# Patient Record
Sex: FEMALE | Race: ASIAN | Employment: UNEMPLOYED | URBAN - METROPOLITAN AREA
[De-identification: names, ages, dates, MRNs, and addresses within clinical notes are randomized per-mention and may not be internally consistent; named-entity substitution may affect disease eponyms.]

---

## 2017-01-20 ENCOUNTER — APPOINTMENT (OUTPATIENT)
Dept: ULTRASOUND IMAGING | Facility: CLINIC | Age: 47
End: 2017-01-20
Attending: EMERGENCY MEDICINE
Payer: MEDICAID

## 2017-01-20 ENCOUNTER — HOSPITAL ENCOUNTER (EMERGENCY)
Facility: CLINIC | Age: 47
Discharge: HOME | End: 2017-01-20
Attending: EMERGENCY MEDICINE | Admitting: EMERGENCY MEDICINE
Payer: MEDICAID

## 2017-01-20 VITALS
RESPIRATION RATE: 18 BRPM | HEART RATE: 78 BPM | WEIGHT: 150 LBS | DIASTOLIC BLOOD PRESSURE: 78 MMHG | SYSTOLIC BLOOD PRESSURE: 125 MMHG | TEMPERATURE: 98.4 F | OXYGEN SATURATION: 99 %

## 2017-01-20 DIAGNOSIS — O20.0 THREATENED MISCARRIAGE: ICD-10-CM

## 2017-01-20 LAB
ABO + RH BLD: NORMAL
B-HCG SERPL-ACNC: 9985 IU/L
BASOPHILS # BLD AUTO: 0 10E9/L (ref 0–0.2)
BASOPHILS NFR BLD AUTO: 0.3 %
BLD GP AB SCN SERPL QL: NORMAL
BLOOD BANK CMNT PATIENT-IMP: NORMAL
BLOOD BANK CMNT PATIENT-IMP: NORMAL
DATE RH IMM GL GVN: NORMAL
DIFFERENTIAL METHOD BLD: ABNORMAL
EOSINOPHIL # BLD AUTO: 0.1 10E9/L (ref 0–0.7)
EOSINOPHIL NFR BLD AUTO: 0.5 %
ERYTHROCYTE [DISTWIDTH] IN BLOOD BY AUTOMATED COUNT: 12.6 % (ref 10–15)
FETAL CELL SCN BLD QL ROSETTE: NORMAL
HCG SERPL QL: POSITIVE
HCT VFR BLD AUTO: 36.9 % (ref 35–47)
HGB BLD-MCNC: 13.1 G/DL (ref 11.7–15.7)
IMM GRANULOCYTES # BLD: 0 10E9/L (ref 0–0.4)
IMM GRANULOCYTES NFR BLD: 0.4 %
LYMPHOCYTES # BLD AUTO: 0.8 10E9/L (ref 0.8–5.3)
LYMPHOCYTES NFR BLD AUTO: 7.3 %
MCH RBC QN AUTO: 31.4 PG (ref 26.5–33)
MCHC RBC AUTO-ENTMCNC: 35.5 G/DL (ref 31.5–36.5)
MCV RBC AUTO: 89 FL (ref 78–100)
MONOCYTES # BLD AUTO: 0.7 10E9/L (ref 0–1.3)
MONOCYTES NFR BLD AUTO: 6.5 %
NEUTROPHILS # BLD AUTO: 8.7 10E9/L (ref 1.6–8.3)
NEUTROPHILS NFR BLD AUTO: 85 %
NRBC # BLD AUTO: 0 10*3/UL
NRBC BLD AUTO-RTO: 0 /100
PLATELET # BLD AUTO: 239 10E9/L (ref 150–450)
RBC # BLD AUTO: 4.17 10E12/L (ref 3.8–5.2)
RH IG VIALS RECOM PATIENT: NORMAL
SPECIMEN EXP DATE BLD: NORMAL
WBC # BLD AUTO: 10.2 10E9/L (ref 4–11)

## 2017-01-20 PROCEDURE — 99285 EMERGENCY DEPT VISIT HI MDM: CPT | Mod: 25

## 2017-01-20 PROCEDURE — 96372 THER/PROPH/DIAG INJ SC/IM: CPT

## 2017-01-20 PROCEDURE — 25000132 ZZH RX MED GY IP 250 OP 250 PS 637: Performed by: EMERGENCY MEDICINE

## 2017-01-20 PROCEDURE — 84702 CHORIONIC GONADOTROPIN TEST: CPT | Performed by: EMERGENCY MEDICINE

## 2017-01-20 PROCEDURE — 96376 TX/PRO/DX INJ SAME DRUG ADON: CPT

## 2017-01-20 PROCEDURE — 96374 THER/PROPH/DIAG INJ IV PUSH: CPT

## 2017-01-20 PROCEDURE — 96375 TX/PRO/DX INJ NEW DRUG ADDON: CPT

## 2017-01-20 PROCEDURE — 76801 OB US < 14 WKS SINGLE FETUS: CPT

## 2017-01-20 PROCEDURE — 86901 BLOOD TYPING SEROLOGIC RH(D): CPT | Performed by: EMERGENCY MEDICINE

## 2017-01-20 PROCEDURE — 86900 BLOOD TYPING SEROLOGIC ABO: CPT | Performed by: EMERGENCY MEDICINE

## 2017-01-20 PROCEDURE — 85461 HEMOGLOBIN FETAL: CPT | Performed by: EMERGENCY MEDICINE

## 2017-01-20 PROCEDURE — 86850 RBC ANTIBODY SCREEN: CPT | Performed by: EMERGENCY MEDICINE

## 2017-01-20 PROCEDURE — 25000125 ZZHC RX 250: Performed by: EMERGENCY MEDICINE

## 2017-01-20 PROCEDURE — 85025 COMPLETE CBC W/AUTO DIFF WBC: CPT | Performed by: EMERGENCY MEDICINE

## 2017-01-20 PROCEDURE — 84703 CHORIONIC GONADOTROPIN ASSAY: CPT | Performed by: EMERGENCY MEDICINE

## 2017-01-20 RX ORDER — ONDANSETRON 2 MG/ML
4 INJECTION INTRAMUSCULAR; INTRAVENOUS ONCE
Status: COMPLETED | OUTPATIENT
Start: 2017-01-20 | End: 2017-01-20

## 2017-01-20 RX ORDER — ONDANSETRON 4 MG/1
4 TABLET, ORALLY DISINTEGRATING ORAL EVERY 4 HOURS PRN
Qty: 10 TABLET | Refills: 0 | Status: SHIPPED | OUTPATIENT
Start: 2017-01-20 | End: 2017-01-23

## 2017-01-20 RX ORDER — MORPHINE SULFATE 4 MG/ML
4 INJECTION, SOLUTION INTRAMUSCULAR; INTRAVENOUS
Status: COMPLETED | OUTPATIENT
Start: 2017-01-20 | End: 2017-01-20

## 2017-01-20 RX ORDER — HYDROCODONE BITARTRATE AND ACETAMINOPHEN 5; 325 MG/1; MG/1
1-2 TABLET ORAL EVERY 4 HOURS PRN
Qty: 20 TABLET | Refills: 0 | Status: SHIPPED | OUTPATIENT
Start: 2017-01-20 | End: 2018-01-17

## 2017-01-20 RX ORDER — HYDROCODONE BITARTRATE AND ACETAMINOPHEN 5; 325 MG/1; MG/1
2 TABLET ORAL ONCE
Status: COMPLETED | OUTPATIENT
Start: 2017-01-20 | End: 2017-01-20

## 2017-01-20 RX ORDER — ONDANSETRON 4 MG/1
4 TABLET, ORALLY DISINTEGRATING ORAL ONCE
Status: DISCONTINUED | OUTPATIENT
Start: 2017-01-20 | End: 2017-01-20

## 2017-01-20 RX ADMIN — ONDANSETRON HYDROCHLORIDE 4 MG: 2 INJECTION, SOLUTION INTRAMUSCULAR; INTRAVENOUS at 21:45

## 2017-01-20 RX ADMIN — HUMAN RHO(D) IMMUNE GLOBULIN 300 MCG: 300 INJECTION, SOLUTION INTRAMUSCULAR at 22:01

## 2017-01-20 RX ADMIN — MORPHINE SULFATE 4 MG: 4 INJECTION, SOLUTION INTRAMUSCULAR; INTRAVENOUS at 18:26

## 2017-01-20 RX ADMIN — MORPHINE SULFATE 4 MG: 4 INJECTION, SOLUTION INTRAMUSCULAR; INTRAVENOUS at 18:50

## 2017-01-20 RX ADMIN — HYDROCODONE BITARTRATE AND ACETAMINOPHEN 2 TABLET: 5; 325 TABLET ORAL at 21:23

## 2017-01-20 RX ADMIN — MORPHINE SULFATE 4 MG: 4 INJECTION, SOLUTION INTRAMUSCULAR; INTRAVENOUS at 19:37

## 2017-01-20 ASSESSMENT — ENCOUNTER SYMPTOMS
ABDOMINAL PAIN: 1
NAUSEA: 1
DIARRHEA: 0
FEVER: 1
CONSTIPATION: 0
CHILLS: 1

## 2017-01-20 NOTE — ED AVS SNAPSHOT
Emergency Department    [address] [facility] [address]    Phone:  [phone]    Fax:  [phone]                                       Almas Hogan   MRN: [id number]    Department:   Emergency Department   Date of Visit:  1/20/2017           After Visit Summary Signature Page     I have received my discharge instructions, and my questions have been answered. I have discussed any challenges I see with this plan with the nurse or doctor.    ..........................................................................................................................................  Patient/Patient Representative Signature      ..........................................................................................................................................  Patient Representative Print Name and Relationship to Patient    ..................................................               ................................................  Date                                            Time    ..........................................................................................................................................  Reviewed by Signature/Title    ...................................................              ..............................................  Date                                                            Time

## 2017-01-20 NOTE — ED AVS SNAPSHOT
Emergency Department    [address] [place] [address]    Phone:  [phone]    Fax:  [phone]                                       Almas Hogan   MRN: [id number]    Department:   Emergency Department   Date of Visit:  2017           Patient Information     Date Of Birth          1970        Your diagnoses for this visit were:     Threatened miscarriage        You were seen by Prosper Matthew MD.      Follow-up Information     Follow up with own OB GYN In 3 days.    Why:  recheck ed if increased pain, fever, weakness, or heavy bleeding        Discharge Instructions         Possible Miscarriage (Threatened )  You may be having a miscarriage.  Common signs of a miscarriage are pain and bleeding. A small amount of bleeding can be normal during the first 3 months of pregnancy. Often the pain and bleeding stop, and you have a normal pregnancy and baby. But heavy bleeding or severe cramping can be an early sign of miscarriage. A  miscarriage  means an unexpected loss of your pregnancy.  At this time, we don t know whether you will have a miscarriage, or if things will clear up and your pregnancy will continue normally. We understand that this is emotionally difficult. There is little we can say to change the way you feel. But understand that miscarriages are common.  About 1 or 2 out of every 10 pregnancies end this way. Some end even before you know you are pregnant. This happens for a number of reasons, and usually we never figure out why. It s important you know that it is not your fault. It didn t happen because you did anything wrong.  Having sex or exercising does not cause a miscarriage. These activities are usually safe unless you have pain or bleeding or your doctor tells you to stop. Even minor falls won t cause a miscarriage. Miscarriages happen because things were not developing as they were supposed to. No medicine can prevent a miscarriage.  Again,  understand that things are uncertain right now. You may still have some bleeding. This may be light spotting or like a period, and you may pass some tissue. You may have some cramping. This is why follow-up care is important.  Home care  To improve the chance of keeping your pregnancy, you should take these steps:    Rest in bed until the pain and bleeding stop.    Don t have sex until your health care provider says it s OK.    Use sanitary napkins instead of tampons.    Don t douche.    Don t take aspirin, ibuprofen, or naproxen.    Don t have alcoholic or caffeinated beverage or smoke.  Follow-up care  Make an appointment with your doctor within the next week, or as directed by our staff.  Note: If you had an ultrasound, a radiologist will review it. You will be told of any new findings that may affect your care.  Call 911  Call 911 if you have:    Severe pain and very heavy bleeding    Severe lightheadedness, passing out, or fainting    Rapid heart rate    Difficulty breathing    Confusion or difficulty waking up  When to seek medical advice  Call your health care provider right away if any of these occur:    Vaginal bleeding or pain that lasts for more than 3 days    Heavy bleeding. This means soaking 1 new pad an hour over 3 hours.    Fever of 100.4 F (38 C) or higher, or as directed by your health care provider    Pain in your lower belly (abdomen) that gets worse    Weakness or dizziness    Passage of anything that resembles tissue. This would be pink or grayish membrane or solid material. Save the tissue in a clean container and bring it to your provider.       7691-8127 [employer]. [address] [place] [address], [place] [address]. All rights reserved. This information is not intended as a substitute for professional medical care. Always follow your healthcare professional's instructions.          24 Hour Appointment Hotline       To make an appointment at any [facility], call [phone]  ([phone]). If you don't have a family doctor or clinic, we will help you find one. Many clinics are conveniently located to serve the needs of you and your family.             Review of your medicines      START taking        Dose / Directions Last dose taken    HYDROcodone-acetaminophen 5-325 MG per tablet   Commonly known as:  NORCO   Dose:  1-2 tablet   Quantity:  20 tablet        Take 1-2 tablets by mouth every 4 hours as needed   Refills:  0        ondansetron 4 MG ODT tab   Commonly known as:  ZOFRAN ODT   Dose:  4 mg   Quantity:  10 tablet        Take 1 tablet (4 mg) by mouth every 4 hours as needed   Refills:  0                Prescriptions were sent or printed at these locations (2 Prescriptions)                   Other Prescriptions                Printed at Department/Unit printer (2 of 2)         HYDROcodone-acetaminophen (NORCO) 5-325 MG per tablet               ondansetron (ZOFRAN ODT) 4 MG ODT tab                Procedures and tests performed during your visit     CBC with platelets differential    HCG QUALitative    HCG quantitative pregnancy    Rh Immune Globulin Study    Rh type    Rhogam Order    US OB 1St Trimester Multiple w Transvag      Orders Needing Specimen Collection     Ordered          01/20/17 1818  UA with Microscopic - STAT, Prio: STAT, Needs to be Collected     Scheduled Task Status   01/20/17 1819 Collect UA with Microscopic Open   Order Class:  PCU Collect                  Pending Results     Date and Time Order Name Status Description    1/20/2017 2038 Rh Immune Globulin Study In process     1/20/2017 1846 US OB 1St Trimester Multiple w Transvag Preliminary             Pending Culture Results     No orders found from 1/19/2017 to 1/21/2017.       Test Results from your hospital stay           1/20/2017  6:54 PM - Interface, Flexilab Results      Component Results     Component Value Ref Range & Units Status    WBC 10.2 4.0 - 11.0 10e9/L Final    RBC Count 4.17 3.8 - 5.2  10e12/L Final    Hemoglobin 13.1 11.7 - 15.7 g/dL Final    Hematocrit 36.9 35.0 - 47.0 % Final    MCV 89 78 - 100 fl Final    MCH 31.4 26.5 - 33.0 pg Final    MCHC 35.5 31.5 - 36.5 g/dL Final    RDW 12.6 10.0 - 15.0 % Final    Platelet Count 239 150 - 450 10e9/L Final    Diff Method Automated Method  Final    % Neutrophils 85.0 % Final    % Lymphocytes 7.3 % Final    % Monocytes 6.5 % Final    % Eosinophils 0.5 % Final    % Basophils 0.3 % Final    % Immature Granulocytes 0.4 % Final    Nucleated RBCs 0 0 /100 Final    Absolute Neutrophil 8.7 (H) 1.6 - 8.3 10e9/L Final    Absolute Lymphocytes 0.8 0.8 - 5.3 10e9/L Final    Absolute Monocytes 0.7 0.0 - 1.3 10e9/L Final    Absolute Eosinophils 0.1 0.0 - 0.7 10e9/L Final    Absolute Basophils 0.0 0.0 - 0.2 10e9/L Final    Abs Immature Granulocytes 0.0 0 - 0.4 10e9/L Final    Absolute Nucleated RBC 0.0  Final         1/20/2017  6:47 PM - Interface, Flexilab Results      Component Results     Component Value Ref Range & Units Status    HCG Qualitative Serum Positive (A) NEG Final         1/20/2017  7:30 PM - Interface, Flexilab Results      Component Results     Component    ABO    O    RH(D)     Neg    Specimen Expires    01/23/2017 1/20/2017  7:48 PM - Interface, Flexilab Results      Component Results     Component Value Ref Range & Units Status    HCG Quantitative Serum 9985 IU/L Final         1/20/2017  8:29 PM - Interface, Radiant Ib      Narrative     PELVIC ULTRASOUND WITH ENDOVAGINAL TRANSDUCER    1/20/2017 8:21 PM     HISTORY: Active bleeding and pain. Question pregnant.    TECHNIQUE: Endovaginal sonography was added to the transabdominal exam  to better evaluate the uterus and ovaries.    COMPARISON: None.    FINDINGS: There is a gestational sac localizing to the lower uterine  segment. A fetal pole is identified with a crown-rump length of 1.3  cm, and estimated gestational age of 7 weeks 4 days. No detected fetal  cardiac activity. No free fluid  in the pelvis noted.        Impression     IMPRESSION: No detected fetal cardiac activity. Gestational sac in an  abnormally low position at the lower uterine segment. These findings  are consistent with nonviable pregnancy. Recommend confirmation with  serial beta hCG assessment and imaging as necessary.         1/20/2017  8:41 PM - Interface, Flexilab Results      Component Results     Component    Rhogam Order    Order received   See Rhogam Study/Suitability           1/20/2017  9:23 PM - Interface, Flexilab Results      Component Results     Component    ABO    O    RH(D)     Neg    Fetal Blood Screen    Canceled, Test credited    Blood Bank Comment    Suitable for Rh Immunoglobulin    RhIg Administered    Pending    Amount of RHIG Required    300 micrograms Rh Immune Globulin required    Antibody Screen    Neg                Clinical Quality Measure: Blood Pressure Screening     Your blood pressure was checked while you were in the emergency department today. The last reading we obtained was  BP: 125/78 mmHg . Please read the guidelines below about what these numbers mean and what you should do about them.  If your systolic blood pressure (the top number) is less than 120 and your diastolic blood pressure (the bottom number) is less than 80, then your blood pressure is normal. There is nothing more that you need to do about it.  If your systolic blood pressure (the top number) is 120-139 or your diastolic blood pressure (the bottom number) is 80-89, your blood pressure may be higher than it should be. You should have your blood pressure rechecked within a year by a primary care provider.  If your systolic blood pressure (the top number) is 140 or greater or your diastolic blood pressure (the bottom number) is 90 or greater, you may have high blood pressure. High blood pressure is treatable, but if left untreated over time it can put you at risk for heart attack, stroke, or kidney failure. You should have your  "blood pressure rechecked by a primary care provider within the next 4 weeks.  If your provider in the emergency department today gave you specific instructions to follow-up with your doctor or provider even sooner than that, you should follow that instruction and not wait for up to 4 weeks for your follow-up visit.        Thank you for choosing [facility]       Thank you for choosing [facility] for your care. Our goal is always to provide you with excellent care. Hearing back from our patients is one way we can continue to improve our services. Please take a few minutes to complete the written survey that you may receive in the mail after you visit with us. Thank you!        [employer] Information     [employer] lets you send messages to your doctor, view your test results, renew your prescriptions, schedule appointments and more. To sign up, go to [email or web address] . Click on \"Log in\" on the left side of the screen, which will take you to the Welcome page. Then click on \"Sign up Now\" on the right side of the page.     You will be asked to enter the access code listed below, as well as some personal information. Please follow the directions to create your username and password.     Your access code is: [email or web address]  Expires: 2017  9:58 PM     Your access code will  in 90 days. If you need help or a new code, please call your [facility] clinic or [phone].        Care EveryWhere ID     This is your Care EveryWhere ID. This could be used by other organizations to access your [facility] medical records  [id number]        After Visit Summary       This is your record. Keep this with you and show to your community pharmacist(s) and doctor(s) at your next visit.                  "

## 2017-01-21 NOTE — DISCHARGE INSTRUCTIONS
Possible Miscarriage (Threatened )  You may be having a miscarriage.  Common signs of a miscarriage are pain and bleeding. A small amount of bleeding can be normal during the first 3 months of pregnancy. Often the pain and bleeding stop, and you have a normal pregnancy and baby. But heavy bleeding or severe cramping can be an early sign of miscarriage. A  miscarriage  means an unexpected loss of your pregnancy.  At this time, we don t know whether you will have a miscarriage, or if things will clear up and your pregnancy will continue normally. We understand that this is emotionally difficult. There is little we can say to change the way you feel. But understand that miscarriages are common.  About 1 or 2 out of every 10 pregnancies end this way. Some end even before you know you are pregnant. This happens for a number of reasons, and usually we never figure out why. It s important you know that it is not your fault. It didn t happen because you did anything wrong.  Having sex or exercising does not cause a miscarriage. These activities are usually safe unless you have pain or bleeding or your doctor tells you to stop. Even minor falls won t cause a miscarriage. Miscarriages happen because things were not developing as they were supposed to. No medicine can prevent a miscarriage.  Again, understand that things are uncertain right now. You may still have some bleeding. This may be light spotting or like a period, and you may pass some tissue. You may have some cramping. This is why follow-up care is important.  Home care  To improve the chance of keeping your pregnancy, you should take these steps:    Rest in bed until the pain and bleeding stop.    Don t have sex until your health care provider says it s OK.    Use sanitary napkins instead of tampons.    Don t douche.    Don t take aspirin, ibuprofen, or naproxen.    Don t have alcoholic or caffeinated beverage or smoke.  Follow-up care  Make an  appointment with your doctor within the next week, or as directed by our staff.  Note: If you had an ultrasound, a radiologist will review it. You will be told of any new findings that may affect your care.  Call 911  Call 911 if you have:    Severe pain and very heavy bleeding    Severe lightheadedness, passing out, or fainting    Rapid heart rate    Difficulty breathing    Confusion or difficulty waking up  When to seek medical advice  Call your health care provider right away if any of these occur:    Vaginal bleeding or pain that lasts for more than 3 days    Heavy bleeding. This means soaking 1 new pad an hour over 3 hours.    Fever of 100.4 F (38 C) or higher, or as directed by your health care provider    Pain in your lower belly (abdomen) that gets worse    Weakness or dizziness    Passage of anything that resembles tissue. This would be pink or grayish membrane or solid material. Save the tissue in a clean container and bring it to your provider.       4739-2062 [employer]. [address] [place] [address], [place] [address]. All rights reserved. This information is not intended as a substitute for professional medical care. Always follow your healthcare professional's instructions.

## 2017-01-21 NOTE — ED NOTES
Pt continues to cramp, Vicodin 2 tabs given with crackers.    Pt then c/o nausea.  Pt given Zofran as ordered.

## 2017-01-21 NOTE — ED NOTES
Pt given Rhogam as ordered.    Pt discharged home in stable condition, some cramping and bleeding noted at time of discharge.  Discharge instructions given and understood.  Prescriptions given to pt at time of discharge.

## 2017-01-21 NOTE — ED PROVIDER NOTES
History     Chief Complaint:  Abdominal Cramping and Vaginal Bleeding      HPI   Almas Hogan is a 46 year old  female who presents with concerns for abdominal pain and vaginal bleeding. The patient had onset of vaginal bleeding and suprapubic abdominal pain this morning which was initially intermittent but is now constant. She has used 7 pads today. The patient has had associated nausea, chills and subjective fever. The patient's last period was 2 months ago and they are irregular at baseline. She does not use birth control or hormone therapy. The patient took ibuprofen at 1400 and has also taken a muscle relaxer today. The patient has had not bowel problems.     Allergies:  No known drug allergies.      Medications:    The patient is currently on no regular medications.       Past Medical History:    History reviewed.  No significant past medical history.       Past Surgical History:         Family History:    History reviewed. No pertinent family history.      Social History:  Marital Status:  Single   Presents to the ED with her boyfriend     Review of Systems   Constitutional: Positive for fever and chills.   Gastrointestinal: Positive for nausea and abdominal pain. Negative for diarrhea and constipation.   Genitourinary: Positive for vaginal bleeding.   All other systems reviewed and are negative.      Physical Exam     Patient Vitals for the past 24 hrs:   BP Temp Temp src Heart Rate Resp SpO2 Weight   17 2030 120/73 mmHg - - - - 96 % -   17 1930 101/70 mmHg - - - - 98 % -   17 1900 111/70 mmHg - - - - 97 % -   17 1806 117/89 mmHg 98.4  F (36.9  C) Oral 74 18 99 % 68.04 kg (150 lb)           Physical Exam  Constitutional:  female supine  HENT: No signs of trauma.   Eyes: EOM are normal. Pupils are equal, round, and reactive to light.   Neck: Normal range of motion. No JVD present. No cervical adenopathy.  Cardiovascular: Regular rhythm.  Exam reveals  no gallop and no friction rub.    No murmur heard.  Pulmonary/Chest: Bilateral breath sounds normal. No wheezes, rhonchi or rales.  Abdominal: Soft. Suprapubic tenderness. No rebound or guarding. 2+ femoral pulses.  : Vulva negative. Vagina with large amount of blood with some clots. Cervix is large with clot within os. Cervix is closed. Lower uterine segment full and firm. No adnexal masses appreciated.   Musculoskeletal: No edema. No tenderness.   Lymphadenopathy: No lymphadenopathy.   Neurological: Alert and oriented to person, place, and time. Normal strength. Coordination normal.   Skin: Skin is warm and dry. No rash noted. No erythema.      Emergency Department Course     Imaging:  Radiographic findings were communicated with the patient who voiced understanding of the findings.    US Pelvis Complete with transvaginal  IMPRESSION: No detected fetal cardiac activity. Gestational sac in an  abnormally low position at the lower uterine segment. These findings  are consistent with nonviable pregnancy. Recommend confirmation with  serial beta hCG assessment and imaging as necessary.  Preliminary radiology read.        Laboratory:  CBC:  WBC 10.2, HGB 13.1,   HCG qualitative: Positive  HCG quantitative: 9985   Rh type: O negative      Interventions:  (1850) Morphine 4 mg IV  (1937) Morphine 4 mg IV  (2123) Norco 5-325 mg PO x 2  (2145) Zofran 4 mg IV  (2201) Rhogam 300 mcg IM    Emergency Department Course:  Nursing notes and vitals reviewed.  (1812) I performed an exam of the patient as documented above.    Blood was drawn from the patient. This was sent for laboratory testing, findings above.      (1835) Patient update.     The patient was sent for a pelvic ultrasound while in the emergency department, findings above.      (2035) Findings and plan explained to the patient. Patient discharged home with instructions regarding supportive care, medications, and reasons to return. The importance of close  follow-up was reviewed. The patient was prescribed Norco.      Impression & Plan      Medical Decision Making:  Almas Hogan is a 46 year old female who presents with lower abdominal pain and vaginal bleeding that started today. She has no nausea or vomiting, urinary or bowel problems. She is sexually active and is not on birth control. She is  to her knowledge. On exam she has suprapubic discomfort. On pelvic exam there was a large amount of blood within the vagina and a small clot in the cervical os which is closed. There is a bluish tint to the cervix. Pregnancy test did come back positive and ultrasound reveals a 7 week 4 day intrauterine pregnancy without fetal heart beat and it is in an abnormal position in the lower uterine segment. Patient is O negative. She is receiving a Rhogam injection and she has been given pain medication. She is feeling more comfortable now. We have talked about the non-viability of the fetus without heart beat at 7.5 weeks and also that most likely she will miscarry. She feels comfortable going home with some pain medication. I have prescribed Vicodin. We have talked about returning to the ED if she should have increasing pain, weakness, fevers, or vaginal bleeding greater than a pad every 30 minutes. She should not have intercourse or use tampons in the meantime. She has her own OB/GYN and she should call Monday morning for reexamination.       Diagnosis:    ICD-10-CM   1. Threatened miscarriage O20.0       Disposition:  Patient is discharged to home.     Discharge Medications:  New Prescriptions    HYDROCODONE-ACETAMINOPHEN (NORCO) 5-325 MG PER TABLET    Take 1-2 tablets by mouth every 4 hours as needed         Jesus Cornejo  2017    EMERGENCY DEPARTMENT    I,Jesus Cornejo, am serving as a scribe on 2017 at 6:12 PM to personally document services performed by Dr. Matthew based on my observations and the provider's statements to me.      Prosper Matthew,  MD  01/20/17 8945

## 2018-01-17 LAB
DEPRECATED S PYO AG THROAT QL EIA: NORMAL
FLUAV+FLUBV AG SPEC QL: NEGATIVE
FLUAV+FLUBV AG SPEC QL: NEGATIVE
SPECIMEN SOURCE: NORMAL
SPECIMEN SOURCE: NORMAL

## 2018-01-17 PROCEDURE — 87081 CULTURE SCREEN ONLY: CPT | Performed by: EMERGENCY MEDICINE

## 2018-01-17 PROCEDURE — 87804 INFLUENZA ASSAY W/OPTIC: CPT | Performed by: EMERGENCY MEDICINE

## 2018-01-17 PROCEDURE — 87880 STREP A ASSAY W/OPTIC: CPT | Performed by: EMERGENCY MEDICINE

## 2018-01-17 PROCEDURE — 99283 EMERGENCY DEPT VISIT LOW MDM: CPT

## 2018-01-18 ENCOUNTER — HOSPITAL ENCOUNTER (EMERGENCY)
Facility: CLINIC | Age: 48
Discharge: HOME | End: 2018-01-18
Attending: EMERGENCY MEDICINE | Admitting: EMERGENCY MEDICINE
Payer: COMMERCIAL

## 2018-01-18 VITALS
HEIGHT: 65 IN | WEIGHT: 148.4 LBS | OXYGEN SATURATION: 97 % | SYSTOLIC BLOOD PRESSURE: 117 MMHG | DIASTOLIC BLOOD PRESSURE: 77 MMHG | BODY MASS INDEX: 24.72 KG/M2 | TEMPERATURE: 100.6 F

## 2018-01-18 DIAGNOSIS — B34.9 VIRAL SYNDROME: ICD-10-CM

## 2018-01-18 PROCEDURE — 25000132 ZZH RX MED GY IP 250 OP 250 PS 637: Performed by: EMERGENCY MEDICINE

## 2018-01-18 RX ORDER — ACETAMINOPHEN 500 MG
1000 TABLET ORAL ONCE
Status: COMPLETED | OUTPATIENT
Start: 2018-01-18 | End: 2018-01-18

## 2018-01-18 RX ORDER — NAPROXEN 500 MG/1
500 TABLET ORAL
Qty: 24 TABLET | Refills: 0 | Status: SHIPPED | OUTPATIENT
Start: 2018-01-18 | End: 2018-01-26

## 2018-01-18 RX ORDER — IBUPROFEN 600 MG/1
600 TABLET, FILM COATED ORAL ONCE
Status: COMPLETED | OUTPATIENT
Start: 2018-01-18 | End: 2018-01-18

## 2018-01-18 RX ADMIN — IBUPROFEN 600 MG: 600 TABLET ORAL at 00:45

## 2018-01-18 RX ADMIN — ACETAMINOPHEN 1000 MG: 500 TABLET, FILM COATED ORAL at 00:44

## 2018-01-18 ASSESSMENT — ENCOUNTER SYMPTOMS
SORE THROAT: 1
DIARRHEA: 0
NAUSEA: 1
FEVER: 1
ABDOMINAL PAIN: 0
HEADACHES: 1
VOMITING: 0
CHILLS: 1

## 2018-01-18 NOTE — DISCHARGE INSTRUCTIONS

## 2018-01-18 NOTE — ED PROVIDER NOTES
"  History     Chief Complaint:  Flu Symptoms    HPI   Almas Hogan is a 47 year old female who presents to the emergency department for evaluation of flu symptoms. The patient states this afternoon she had an intense onset of cold chills, sore throat, headache, a minor fever, and body aches. She also complains of nausea but denies vomiting, diarrhea, abdominal pain, chest pain. She has not taken any meds for this.     Allergies:  No known Drug Allergies      Medications:    Medications reviewed. No current medications.     Past Medical History:    History reviewed. No pertinent past medical history.    Past Surgical History:    Gateway teeth  C section    Family History:    History reviewed. No pertinent family history.     Social History:  Marital Status:  Single [1]  Social History   Substance Use Topics     Smoking status: Never Smoker     Smokeless tobacco: Never Used     Alcohol use Yes      Comment: occasional        Review of Systems   Constitutional: Positive for chills and fever.   HENT: Positive for sore throat.    Cardiovascular: Negative for chest pain.   Gastrointestinal: Positive for nausea. Negative for abdominal pain, diarrhea and vomiting.   Neurological: Positive for headaches.   All other systems reviewed and are negative.        Physical Exam   First Vitals:  BP: 118/73  Heart Rate: 99  Temp: 100.6  F (38.1  C)  Height: 165.1 cm (5' 5\")  Weight: 67.3 kg (148 lb 6.4 oz)  SpO2: 98 %      Physical Exam  Vitals: reviewed by me  General: Pt seen on [facility], pleasant, cooperative, and alert to conversation, has mask on and many blankets, appears cold, but no diaphoresis, nontoxic appearing.    Eyes: Tracking well, clear conjunctiva BL  ENT: MMM, midline trachea. Oropharynx unremarkable, appears well-hydrated.  Lymph: No lymphadenopathy or masses noted  Lungs:   No tachypnea, no accessory muscle use. No respiratory distress.  Lungs are clear to auscultation bilaterally, patient's coughing " frequently.  CV: Rate as above, regular rhythm.    Abd: Soft, non tender, no guarding, no rebound. Non distended  MSK: no peripheral edema or joint effusion.  No evidence of trauma  Skin: No rash, normal turgor and temperature  Neuro: Clear speech and no facial droop.  Moving all extremities spontaneously  Psych: Not RIS, no e/o AH/VH        Emergency Department Course     Laboratory:  Laboratory findings were communicated with the patient who voiced understanding of the findings.    Rapid strep: negative  Beta strep: Pending  Influenza: negative    Interventions:  0044 tylenol 1000 mg PO  0045 ibuprofen 600 mg PO    Emergency Department Course:  Nursing notes and vitals reviewed.  I performed an exam of the patient as documented above.   I discussed the treatment plan with the patient. They expressed understanding of this plan and consented to discharge. They will be discharged home with instructions for care and follow up. In addition, the patient will return to the emergency department if their symptoms persist, worsen, if new symptoms arise or if there is any concern.  All questions were answered.     I personally reviewed the lab results with the patient and answered all related questions prior to discharge.  Impression & Plan      Medical Decision Making:  This patient is a 47 female who presents with what appears to be a viral syndrome. Influenza negative, and has a normal cardiopulmonary exam at bedside but does have subjective fevers chills and low grade fever here which has ameliorated here with symptomatic treatment only. Patient has no significant comorbidities, and while she still may have the flu I have elected to treat her supportively with rest, fluids and naproxen and primary care provider follow up win the coming week. Specifically will note that I see no evidence of pneumonia, abdominal malady, no evidence of bacterial sepsis, or oropharyngeal abnormality. Patient is acting appropriately with  family at bedside and with cough, cold, runny nose, and muscle aches this does appear to be a flu like syndrome. Will discharged as above with primary care provider follow up.     Diagnosis:    ICD-10-CM    1. Viral syndrome B34.9      Disposition:   Discharged    Discharge Medications:  New Prescriptions    NAPROXEN (NAPROSYN) 500 MG TABLET    Take 1 tablet (500 mg) by mouth 3 times daily (with meals) for 8 days       Scribe Disclosure:  I, Shalom Lucas, am serving as a scribe at 2:05 AM on 1/18/2018 to document services personally performed by Garrett Gutierrez MD, based on my observations and the provider's statements to me.       EMERGENCY DEPARTMENT       Garrett Gutierrez MD  01/18/18 2119

## 2018-01-18 NOTE — ED AVS SNAPSHOT
"  Emergency Department    [address] [facility] [address]    Phone:  [phone]    Fax:  [phone]                                       Almas Hogan   MRN: [id number]    Department:   Emergency Department   Date of Visit:  1/17/2018           Patient Information     Date Of Birth          1970        Your diagnoses for this visit were:     Viral syndrome        You were seen by Garrett Gutierrez MD.      Follow-up Information     Follow up with No Ref-Primary, Physician. Schedule an appointment as soon as possible for a visit in 2 days.    Why:  As needed, For repeat evaluation and symptom check        Follow up with  Emergency Department.    Specialty:  EMERGENCY MEDICINE    Why:  If symptoms worsen    Contact information:    [address] [facility] [address] [phone]        Discharge Instructions         Viral Syndrome (Adult)  A viral illness may cause a number of symptoms. The symptoms depend on the part of the body that the virus affects. If it settles in your nose, throat, and lungs, it may cause cough, sore throat, congestion, and sometimes headache. If it settles in your stomach and intestinal tract, it may cause vomiting and diarrhea. Sometimes it causes vague symptoms like \"aching all over,\" feeling tired, loss of appetite, or fever.  A viral illness usually lasts 1 to 2 weeks, but sometimes it lasts longer. In some cases, a more serious infection can look like a viral syndrome in the first few days of the illness. You may need another exam and additional tests to know the difference. Watch for the warning signs listed below.  Home care  Follow these guidelines for taking care of yourself at home:    If symptoms are severe, rest at home for the first 2 to 3 days.    Stay away from cigarette smoke - both your smoke and the smoke from others.    You may use over-the-counter acetaminophen or ibuprofen for fever, muscle aching, " and headache, unless another medicine was prescribed for this. If you have chronic liver or kidney disease or ever had a stomach ulcer or GI bleeding, talk with your doctor before using these medicines. No one who is younger than 18 and ill with a fever should take aspirin. It may cause severe disease or death.    Your appetite may be poor, so a light diet is fine. Avoid dehydration by drinking 8 to 12 8-ounce glasses of fluids each day. This may include water; orange juice; lemonade; apple, grape, and cranberry juice; clear fruit drinks; electrolyte replacement and sports drinks; and decaffeinated teas and coffee. If you have been diagnosed with a kidney disease, ask your doctor how much and what types of fluids you should drink to prevent dehydration. If you have kidney disease, drinking too much fluid can cause it build up in the your body and be dangerous to your health.    Over-the-counter remedies won't shorten the length of the illness but may be helpful for cough, sore throat; and nasal and sinus congestion. Don't use decongestants if you have high blood pressure.  Follow-up care  Follow up with your healthcare provider if you do not improve over the next week.  Call 911  Get emergency medical care if any of the following occur:    Convulsion    Feeling weak, dizzy, or like you are going to faint    Chest pain, shortness of breath, wheezing, or difficulty breathing  When to seek medical advice  Call your healthcare provider right away if any of these occur:    Cough with lots of colored sputum (mucus) or blood in your sputum    Chest pain, shortness of breath, wheezing, or difficulty breathing    Severe headache; face, neck, or ear pain    Severe, constant pain in the lower right side of your belly (abdominal)    Continued vomiting (can t keep liquids down)    Frequent diarrhea (more than 5 times a day); blood (red or black color) or mucus in diarrhea    Feeling weak, dizzy, or like you are going to  faint    Extreme thirst    Fever of 100.4 F (38 C) or higher, or as directed by your healthcare provider  Date Last Reviewed: 9/25/2015 2000-2017 [employer]. [address] [place] [address], [place] [address]. All rights reserved. This information is not intended as a substitute for professional medical care. Always follow your healthcare professional's instructions.          24 Hour Appointment Hotline       To make an appointment at any [facility], call [phone] ([phone]). If you don't have a family doctor or clinic, we will help you find one. [facility] clinics are conveniently located to serve the needs of you and your family.             Review of your medicines      START taking        Dose / Directions Last dose taken    naproxen 500 MG tablet   Commonly known as:  NAPROSYN   Dose:  500 mg   Quantity:  24 tablet        Take 1 tablet (500 mg) by mouth 3 times daily (with meals) for 8 days   Refills:  0                Prescriptions were sent or printed at these locations (1 Prescription)                   Other Prescriptions                Printed at Department/Unit printer (1 of 1)         naproxen (NAPROSYN) 500 MG tablet                Procedures and tests performed during your visit     Beta strep group A culture    Influenza A/B antigen    Rapid strep screen      Orders Needing Specimen Collection     None      Pending Results     Date and Time Order Name Status Description    1/17/2018 2315 Beta strep group A culture Preliminary             Pending Culture Results     Date and Time Order Name Status Description    1/17/2018 2315 Beta strep group A culture Preliminary             Pending Results Instructions     If you had any lab results that were not finalized at the time of your Discharge, you can call the ED Lab Result RN at [phone]. You will be contacted by this team for any positive Lab results or changes in treatment. The nurses are available 7 days a week from 10A to  6:30P.  You can leave a message 24 hours per day and they will return your call.        Test Results From Your Hospital Stay        1/17/2018 11:51 PM      Component Results     Component Value Ref Range & Units Status    Influenza A/B Agn Specimen Nasopharyngeal  Final    Influenza A Negative NEG^Negative Final    Influenza B Negative NEG^Negative Final    Test results must be correlated with clinical data. If necessary, results   should be confirmed by a molecular assay or viral culture.           1/17/2018 11:37 PM      Component Results     Component    Specimen Description    Throat    Rapid Strep A Screen    NEGATIVE: No Group A streptococcal antigen detected by immunoassay, await culture report.         1/18/2018  1:24 AM      Component Results     Component    Specimen Description    Throat    Special Requests    Specimen collected in eSwab transport (white cap)    Culture Micro    PENDING                Clinical Quality Measure: Blood Pressure Screening     Your blood pressure was checked while you were in the emergency department today. The last reading we obtained was  BP: 117/77 . Please read the guidelines below about what these numbers mean and what you should do about them.  If your systolic blood pressure (the top number) is less than 120 and your diastolic blood pressure (the bottom number) is less than 80, then your blood pressure is normal. There is nothing more that you need to do about it.  If your systolic blood pressure (the top number) is 120-139 or your diastolic blood pressure (the bottom number) is 80-89, your blood pressure may be higher than it should be. You should have your blood pressure rechecked within a year by a primary care provider.  If your systolic blood pressure (the top number) is 140 or greater or your diastolic blood pressure (the bottom number) is 90 or greater, you may have high blood pressure. High blood pressure is treatable, but if left untreated over time it can put  "you at risk for heart attack, stroke, or kidney failure. You should have your blood pressure rechecked by a primary care provider within the next 4 weeks.  If your provider in the emergency department today gave you specific instructions to follow-up with your doctor or provider even sooner than that, you should follow that instruction and not wait for up to 4 weeks for your follow-up visit.        Thank you for choosing [facility]       Thank you for choosing [facility] for your care. Our goal is always to provide you with excellent care. Hearing back from our patients is one way we can continue to improve our services. Please take a few minutes to complete the written survey that you may receive in the mail after you visit with us. Thank you!        [email or web address] Information     [employer] lets you send messages to your doctor, view your test results, renew your prescriptions, schedule appointments and more. To sign up, go to [email or web address] . Click on \"Log in\" on the left side of the screen, which will take you to the Welcome page. Then click on \"Sign up Now\" on the right side of the page.     You will be asked to enter the access code listed below, as well as some personal information. Please follow the directions to create your username and password.     Your access code is: [email or web address]  Expires: 2018  2:00 AM     Your access code will  in 90 days. If you need help or a new code, please call your [facility] clinic or [phone].        Care EveryWhere ID     This is your Care EveryWhere ID. This could be used by other organizations to access your [facility] medical records  [id number]        Equal Access to Services     [facility] : Hadii martina cherry Socarroll, waaxda luqadaha, qaybta kaalmaosmin flores . So [facility] [phone].    ATENCIÓN: Si habla español, tiene a fritz disposición servicios gratuitos de asistencia lingüística. Llame al [phone].    We " comply with applicable federal civil rights laws and [place] laws. We do not discriminate on the basis of race, color, national origin, age, disability, sex, sexual orientation, or gender identity.            After Visit Summary       This is your record. Keep this with you and show to your community pharmacist(s) and doctor(s) at your next visit.

## 2018-01-18 NOTE — ED AVS SNAPSHOT
Emergency Department    [address] [facility] [address]    Phone:  [phone]    Fax:  [phone]                                       Almas Hogan   MRN: [id number]    Department:   Emergency Department   Date of Visit:  1/17/2018           After Visit Summary Signature Page     I have received my discharge instructions, and my questions have been answered. I have discussed any challenges I see with this plan with the nurse or doctor.    ..........................................................................................................................................  Patient/Patient Representative Signature      ..........................................................................................................................................  Patient Representative Print Name and Relationship to Patient    ..................................................               ................................................  Date                                            Time    ..........................................................................................................................................  Reviewed by Signature/Title    ...................................................              ..............................................  Date                                                            Time

## 2018-01-20 LAB
BACTERIA SPEC CULT: NORMAL
Lab: NORMAL
SPECIMEN SOURCE: NORMAL

## 2021-09-07 ENCOUNTER — HOSPITAL ENCOUNTER (EMERGENCY)
Facility: CLINIC | Age: 51
Discharge: HOME | End: 2021-09-07
Attending: EMERGENCY MEDICINE | Admitting: EMERGENCY MEDICINE

## 2021-09-07 ENCOUNTER — APPOINTMENT (OUTPATIENT)
Dept: CT IMAGING | Facility: CLINIC | Age: 51
End: 2021-09-07
Attending: EMERGENCY MEDICINE

## 2021-09-07 ENCOUNTER — APPOINTMENT (OUTPATIENT)
Dept: GENERAL RADIOLOGY | Facility: CLINIC | Age: 51
End: 2021-09-07
Attending: EMERGENCY MEDICINE

## 2021-09-07 VITALS
HEART RATE: 67 BPM | DIASTOLIC BLOOD PRESSURE: 70 MMHG | TEMPERATURE: 96.5 F | OXYGEN SATURATION: 98 % | RESPIRATION RATE: 12 BRPM | SYSTOLIC BLOOD PRESSURE: 104 MMHG

## 2021-09-07 DIAGNOSIS — R06.00 DYSPNEA, UNSPECIFIED TYPE: ICD-10-CM

## 2021-09-07 DIAGNOSIS — R07.9 ACUTE CHEST PAIN: ICD-10-CM

## 2021-09-07 LAB
ANION GAP SERPL CALCULATED.3IONS-SCNC: 5 MMOL/L (ref 3–14)
ATRIAL RATE - MUSE: 69 BPM
BASOPHILS # BLD AUTO: 0.1 10E3/UL (ref 0–0.2)
BASOPHILS NFR BLD AUTO: 1 %
BUN SERPL-MCNC: 16 MG/DL (ref 7–30)
CALCIUM SERPL-MCNC: 9.5 MG/DL (ref 8.5–10.1)
CHLORIDE BLD-SCNC: 109 MMOL/L (ref 94–109)
CO2 SERPL-SCNC: 24 MMOL/L (ref 20–32)
CREAT SERPL-MCNC: 1.05 MG/DL (ref 0.52–1.04)
D DIMER PPP FEU-MCNC: 1.94 UG/ML FEU (ref 0–0.5)
DIASTOLIC BLOOD PRESSURE - MUSE: NORMAL MMHG
EOSINOPHIL # BLD AUTO: 0.2 10E3/UL (ref 0–0.7)
EOSINOPHIL NFR BLD AUTO: 3 %
ERYTHROCYTE [DISTWIDTH] IN BLOOD BY AUTOMATED COUNT: 12.7 % (ref 10–15)
GFR SERPL CREATININE-BSD FRML MDRD: 62 ML/MIN/1.73M2
GLUCOSE BLD-MCNC: 78 MG/DL (ref 70–99)
HCT VFR BLD AUTO: 37.4 % (ref 35–47)
HGB BLD-MCNC: 12.5 G/DL (ref 11.7–15.7)
IMM GRANULOCYTES # BLD: 0 10E3/UL
IMM GRANULOCYTES NFR BLD: 0 %
INTERPRETATION ECG - MUSE: NORMAL
LYMPHOCYTES # BLD AUTO: 2 10E3/UL (ref 0.8–5.3)
LYMPHOCYTES NFR BLD AUTO: 28 %
MCH RBC QN AUTO: 28.5 PG (ref 26.5–33)
MCHC RBC AUTO-ENTMCNC: 33.4 G/DL (ref 31.5–36.5)
MCV RBC AUTO: 85 FL (ref 78–100)
MONOCYTES # BLD AUTO: 0.5 10E3/UL (ref 0–1.3)
MONOCYTES NFR BLD AUTO: 7 %
NEUTROPHILS # BLD AUTO: 4.2 10E3/UL (ref 1.6–8.3)
NEUTROPHILS NFR BLD AUTO: 61 %
NRBC # BLD AUTO: 0 10E3/UL
NRBC BLD AUTO-RTO: 0 /100
P AXIS - MUSE: 47 DEGREES
PLATELET # BLD AUTO: 313 10E3/UL (ref 150–450)
POTASSIUM BLD-SCNC: 4 MMOL/L (ref 3.4–5.3)
PR INTERVAL - MUSE: 128 MS
QRS DURATION - MUSE: 80 MS
QT - MUSE: 398 MS
QTC - MUSE: 426 MS
R AXIS - MUSE: 45 DEGREES
RBC # BLD AUTO: 4.38 10E6/UL (ref 3.8–5.2)
SARS-COV-2 RNA RESP QL NAA+PROBE: NEGATIVE
SODIUM SERPL-SCNC: 138 MMOL/L (ref 133–144)
SYSTOLIC BLOOD PRESSURE - MUSE: NORMAL MMHG
T AXIS - MUSE: 43 DEGREES
TROPONIN I SERPL-MCNC: <0.015 UG/L (ref 0–0.04)
VENTRICULAR RATE- MUSE: 69 BPM
WBC # BLD AUTO: 7 10E3/UL (ref 4–11)

## 2021-09-07 PROCEDURE — 93005 ELECTROCARDIOGRAM TRACING: CPT

## 2021-09-07 PROCEDURE — 36415 COLL VENOUS BLD VENIPUNCTURE: CPT | Performed by: EMERGENCY MEDICINE

## 2021-09-07 PROCEDURE — 250N000009 HC RX 250: Performed by: EMERGENCY MEDICINE

## 2021-09-07 PROCEDURE — C9803 HOPD COVID-19 SPEC COLLECT: HCPCS

## 2021-09-07 PROCEDURE — 71275 CT ANGIOGRAPHY CHEST: CPT

## 2021-09-07 PROCEDURE — 82374 ASSAY BLOOD CARBON DIOXIDE: CPT | Performed by: EMERGENCY MEDICINE

## 2021-09-07 PROCEDURE — 99285 EMERGENCY DEPT VISIT HI MDM: CPT | Mod: 25

## 2021-09-07 PROCEDURE — 87635 SARS-COV-2 COVID-19 AMP PRB: CPT | Performed by: EMERGENCY MEDICINE

## 2021-09-07 PROCEDURE — 71046 X-RAY EXAM CHEST 2 VIEWS: CPT

## 2021-09-07 PROCEDURE — 250N000011 HC RX IP 250 OP 636: Performed by: EMERGENCY MEDICINE

## 2021-09-07 PROCEDURE — 85004 AUTOMATED DIFF WBC COUNT: CPT | Performed by: EMERGENCY MEDICINE

## 2021-09-07 PROCEDURE — 85379 FIBRIN DEGRADATION QUANT: CPT | Performed by: EMERGENCY MEDICINE

## 2021-09-07 PROCEDURE — 84484 ASSAY OF TROPONIN QUANT: CPT | Performed by: EMERGENCY MEDICINE

## 2021-09-07 RX ORDER — IOPAMIDOL 755 MG/ML
59 INJECTION, SOLUTION INTRAVASCULAR ONCE
Status: COMPLETED | OUTPATIENT
Start: 2021-09-07 | End: 2021-09-07

## 2021-09-07 RX ORDER — ALBUTEROL SULFATE 90 UG/1
2 AEROSOL, METERED RESPIRATORY (INHALATION) EVERY 6 HOURS PRN
Qty: 8 G | Refills: 0 | Status: SHIPPED | OUTPATIENT
Start: 2021-09-07

## 2021-09-07 RX ADMIN — SODIUM CHLORIDE 85 ML: 9 INJECTION, SOLUTION INTRAVENOUS at 19:35

## 2021-09-07 RX ADMIN — IOPAMIDOL 59 ML: 755 INJECTION, SOLUTION INTRAVENOUS at 19:35

## 2021-09-07 ASSESSMENT — ENCOUNTER SYMPTOMS
SORE THROAT: 0
COUGH: 1
SHORTNESS OF BREATH: 1
TROUBLE SWALLOWING: 1
FEVER: 0

## 2021-09-07 NOTE — ED PROVIDER NOTES
History     Chief Complaint:  Shortness of Breath (Pt reports SOB and bilateral chest pain for the last 2 weeks.) and Chest Pain      HPI   Almas Hogan is a 51 year old female with no significant past medical history of cardiac problems who presents with shortness of breath. For the past two weeks, the patient has experienced intermittent shortness of breath and throat tightness. She has tried her son's inhaler with no change in symptoms. Here, she describes difficulty with deep inhalation and that the throat tightness has caused difficulty with swallowing as well. She also reports a slight dry cough and sharp chest pain on her bilateral sides at times, and tingling in her hands and feet while trying to fall asleep. She denies any sore throat, fevers, leg pain/swelling, anosmia, or ageusia. In addition, the patient tested negative for COVID-19 today.     Review of Systems   Constitutional: Negative for fever.   HENT: Positive for trouble swallowing. Negative for sore throat.         No anosmia  No ageusia  Tightness in throat   Respiratory: Positive for cough (slight dry cough) and shortness of breath.    Cardiovascular: Positive for chest pain (bilateral sides). Negative for leg swelling.   Neurological:        Tingling in hands and feet   All other systems reviewed and are negative.      Allergies:  The patient has no known allergies.      Medications:    The patient does not take daily medications.       Past Medical History:    The patient has no past medical history. She has no medical history of cardiac problems.       Past Surgical History:    ENT surgery, wisdom teeth  Gyn surgery,  section      Physical Exam     Patient Vitals for the past 24 hrs:   BP Temp Temp src Pulse Resp SpO2   210 104/70 -- -- -- -- --   21 -- -- -- 67 12 --   21 194 -- -- -- 74 11 98 %   21 1926 122/79 -- -- 66 -- 98 %   21 1456 126/75 (!) 96.5  F (35.8  C) Temporal 70 16 93 %        Physical Exam    Nursing note and vitals reviewed.  Constitutional:  Appears well-developed and well-nourished.   HENT:   Head:    Atraumatic.   Mouth/Throat:   Oropharynx is clear and moist. No oropharyngeal exudate.   Eyes:    Pupils are equal, round, and reactive to light.   Neck:    Normal range of motion. Neck supple.      No tracheal deviation present. No thyromegaly present.   Cardiovascular:  Normal rate, regular rhythm, no murmur   Pulmonary/Chest: Breath sounds are clear and equal without wheezes or crackles. No visible respiratory distress.  Abdominal:   Soft. Bowel sounds are normal. Exhibits no distension and      no mass. There is no tenderness.      There is no rebound and no guarding.   Musculoskeletal:  Exhibits no edema.   Lymphadenopathy:  No cervical adenopathy.   Neurological:   Alert and oriented to person, place, and time.   Skin:    Skin is warm and dry. No rash noted. No pallor.     Emergency Department Course   ECG:  ECG taken at 1507, ECG read at 1752  Normal sinus rhythm   Low voltage QRS   Borderline ECG    Rate 69 bpm. VA interval 128 ms. QRS duration 80 ms. QT/QTc 398/426 ms. P-R-T axes 47 45 43.     Imaging:  CT Chest Pulmonary Embolism w Contrast   Final Result   IMPRESSION:   1.  No pulmonary embolism or acute airspace disease. No acute thoracic   aortic abnormality.   2.  Potential faint layering gallstones.      BERNY OLEA MD            SYSTEM ID:  [id number]      XR Chest 2 Views   Final Result   IMPRESSION: No acute cardiopulmonary disease.      BERNY OLEA MD            SYSTEM ID:  [id number]      Echo Stress Echocardiogram    (Results Pending)       Laboratory:  CBC: WNL (WBC 7.0, HGB 12.5, )  BMP: Cr 1.05 (high), o/w WNL  D-dimer: 1.94 (high)   1811: Troponin: <0.015 (negative)    Symptomatic COVID-19 Virus by PCR Nasopharyngeal: Negative     Emergency Department Course:    Reviewed:  I reviewed nursing notes, vitals, past history and care everywhere    Assessments:  1741 I  obtained history and examined the patient as noted above.    I rechecked the patient and explained findings.     Disposition:  The patient was discharged to home.    Impression & Plan      Medical Decision Making:  I considered the possibility of pulmonary embolism, so D-dimer was elevated, however chest CT was negative for pulmonary embolism and she had no risk factors for this. I discussed the incidental the finding and the possible gallstones with her. She has not been having any symptoms concerning for gallbladder attacks. There was no sign of PE or cardiac problem. There was no sign of pulmonary findings on her chest CT and no sign of pericardial effusion. Ultimately, I considered the possibility of a viral infection with reactive airways so she was prescribed an albuterol inhaler since she stated that her son's inhaler was  and she was instructed to have close follow-up with her primary care provider tomorrow. I felt that angina was unlikely, however, this was considered in the differential so I ordered an out-patient stress echo for her and she was told to return if symptoms worsened or became more persistent. Her COVID-19 test was negative. I discussed with her that anxiety is in the differential and she should discuss this with her primary care provider.     Covid-19  Almas Hogan was evaluated during a global COVID-19 pandemic, which necessitated consideration that the patient might be at risk for infection with the SARS-CoV-2 virus that causes COVID-19.   Applicable protocols for evaluation were followed during the patient's care.   COVID-19 was considered as part of the patient's evaluation. The plan for testing is:  a test was obtained during this visit.    Diagnosis:    ICD-10-CM    1. Dyspnea, unspecified type  R06.00 Echo Stress Echocardiogram   2. Acute chest pain  R07.9 Echo Stress Echocardiogram       Discharge Medications:  There are no discharge medications for this  patient.        Scribe Disclosure:  I, Mitzi Yonathan, am serving as a scribe at 5:41 PM on 9/7/2021 to document services personally performed by Victorina Goins MD based on my observations and the provider's statements to me.      Victorina Goins MD  09/08/21 0247